# Patient Record
Sex: FEMALE | Race: WHITE
[De-identification: names, ages, dates, MRNs, and addresses within clinical notes are randomized per-mention and may not be internally consistent; named-entity substitution may affect disease eponyms.]

---

## 2019-07-04 ENCOUNTER — HOSPITAL ENCOUNTER (EMERGENCY)
Dept: HOSPITAL 74 - JER | Age: 29
Discharge: HOME | End: 2019-07-04
Payer: COMMERCIAL

## 2019-07-04 VITALS — DIASTOLIC BLOOD PRESSURE: 88 MMHG | HEART RATE: 82 BPM | SYSTOLIC BLOOD PRESSURE: 132 MMHG | TEMPERATURE: 97.9 F

## 2019-07-04 VITALS — BODY MASS INDEX: 32 KG/M2

## 2019-07-04 DIAGNOSIS — N20.0: Primary | ICD-10-CM

## 2019-07-04 LAB
ALBUMIN SERPL-MCNC: 3.8 G/DL (ref 3.4–5)
ALP SERPL-CCNC: 130 U/L (ref 45–117)
ALT SERPL-CCNC: 16 U/L (ref 13–61)
ANION GAP SERPL CALC-SCNC: 6 MMOL/L (ref 8–16)
APPEARANCE UR: (no result)
AST SERPL-CCNC: 9 U/L (ref 15–37)
BACTERIA # UR AUTO: 617.5 /HPF
BASOPHILS # BLD: 0.7 % (ref 0–2)
BILIRUB SERPL-MCNC: 0.2 MG/DL (ref 0.2–1)
BILIRUB UR STRIP.AUTO-MCNC: NEGATIVE MG/DL
BUN SERPL-MCNC: 20 MG/DL (ref 7–18)
CALCIUM SERPL-MCNC: 8.7 MG/DL (ref 8.5–10.1)
CASTS URNS QL MICRO: 10 /LPF (ref 0–8)
CHLORIDE SERPL-SCNC: 112 MMOL/L (ref 98–107)
CO2 SERPL-SCNC: 25 MMOL/L (ref 21–32)
COLOR UR: YELLOW
CREAT SERPL-MCNC: 0.8 MG/DL (ref 0.55–1.3)
CRYSTALS URNS QL MICRO: (no result) /HPF
DEPRECATED RDW RBC AUTO: 13.2 % (ref 11.6–15.6)
EOSINOPHIL # BLD: 0.7 % (ref 0–4.5)
EPITH CASTS URNS QL MICRO: 6.2 /HPF
GLUCOSE SERPL-MCNC: 100 MG/DL (ref 74–106)
HCT VFR BLD CALC: 41.1 % (ref 32.4–45.2)
HGB BLD-MCNC: 13.5 GM/DL (ref 10.7–15.3)
KETONES UR QL STRIP: (no result)
LEUKOCYTE ESTERASE UR QL STRIP.AUTO: (no result)
LIPASE SERPL-CCNC: 191 U/L (ref 73–393)
LYMPHOCYTES # BLD: 27.8 % (ref 8–40)
MCH RBC QN AUTO: 29.5 PG (ref 25.7–33.7)
MCHC RBC AUTO-ENTMCNC: 32.7 G/DL (ref 32–36)
MCV RBC: 90.1 FL (ref 80–96)
MONOCYTES # BLD AUTO: 5.8 % (ref 3.8–10.2)
NEUTROPHILS # BLD: 65 % (ref 42.8–82.8)
NITRITE UR QL STRIP: NEGATIVE
PH UR: 5.5 [PH] (ref 5–8)
PLATELET # BLD AUTO: 240 K/MM3 (ref 134–434)
PMV BLD: 8.9 FL (ref 7.5–11.1)
POTASSIUM SERPLBLD-SCNC: 3.6 MMOL/L (ref 3.5–5.1)
PROT SERPL-MCNC: 7.6 G/DL (ref 6.4–8.2)
PROT UR QL STRIP: (no result)
PROT UR QL STRIP: NEGATIVE
RBC # BLD AUTO: 288 /HPF (ref 0–4)
RBC # BLD AUTO: 4.57 M/MM3 (ref 3.6–5.2)
SODIUM SERPL-SCNC: 143 MMOL/L (ref 136–145)
SP GR UR: 1.03 (ref 1.01–1.03)
UROBILINOGEN UR STRIP-MCNC: 1 MG/DL (ref 0.2–1)
WBC # BLD AUTO: 11.5 K/MM3 (ref 4–10)
WBC # UR AUTO: 28 /HPF (ref 0–5)

## 2019-07-04 PROCEDURE — 3E0337Z INTRODUCTION OF ELECTROLYTIC AND WATER BALANCE SUBSTANCE INTO PERIPHERAL VEIN, PERCUTANEOUS APPROACH: ICD-10-PCS

## 2019-07-04 PROCEDURE — 3E0333Z INTRODUCTION OF ANTI-INFLAMMATORY INTO PERIPHERAL VEIN, PERCUTANEOUS APPROACH: ICD-10-PCS

## 2019-07-04 NOTE — PDOC
History of Present Illness





- General


Chief Complaint: Pain, Acute


Stated Complaint: LOWER BACK PAIN


Time Seen by Provider: 19 18:54


History Source: Patient


Exam Limitations: No Limitations





- History of Present Illness


Initial Comments: 


Patient reports sharp L back pain and LLQ abdominal pain "similar to having a 

baby" that has been present constantly today and has not been relieved with 

Motrin. Initially, the patient had sharp L back pain that woke her from sleep 

two nights ago, alleviated at that time after some Motrin. Yesterday the 

patient had some nausea, but no pain. Today, the patient denies nausea. Patient 

also reports months of lower abdominal pain "that feels like you have to pass 

gas but you can't", but that has typically been on both lower abdominal 

quadrants. She further notes that she has a copper IUD in place, and 

occasionally has crampy pain with this. She is not certain whether this pain is 

similar. LMP was about 2 weeks ago. On ROS, patient denies CP, SOB, fevers, 

chills, N/V, constipation/diarrhea. Patient does states she has had increased 

urinary frequency, up to 6 times today, without burning.


19 19:56








Past History





- Past Medical History


Allergies/Adverse Reactions: 


 Allergies











Allergy/AdvReac Type Severity Reaction Status Date / Time


 


No Known Allergies Allergy   Verified 19 18:54











Home Medications: 


Ambulatory Orders





Ciprofloxacin [Cipro -] 500 mg PO Q12H #14 tablet 19 


Ibuprofen 600 mg PO TID PRN #20 tablet 19 


Tamsulosin HCl [Flomax] 0.4 mg PO BID #14 capsule 19 











- Reproductive History


LMP comment: Around 2 weeks ago


 (#): 3


Para: 2





- Suicide/Smoking/Psychosocial Hx


Smoking History: Never smoked


Have you smoked in the past 12 months: No


Hx Alcohol Use: No


Drug/Substance Use Hx: No





**Review of Systems





- Review of Systems


Able to Perform ROS?: Yes


Constitutional: No: Chills, Fever, Night Sweats


Respiratory: No: Cough, Shortness of Breath


Cardiac (ROS): No: Chest Pain


ABD/GI: No: Constipated, Diarrhea, Nausea, Vomiting


: Yes: Frequency.  No: Burning





*Physical Exam





- Vital Signs


 Last Vital Signs











Temp Pulse Resp BP Pulse Ox


 


 98.4 F   77   16   136/88   100 


 


 19 18:55  19 18:55  19 18:55  19 18:55  19 18:55














- Physical Exam


General Appearance: Yes: Nourished, Appropriately Dressed


HEENT: positive: PHYLLIS


Neck: positive: Supple


Respiratory/Chest: positive: Lungs Clear, Normal Breath Sounds


Cardiovascular: positive: Regular Rhythm, Regular Rate


Gastrointestinal/Abdominal: positive: Normal Bowel Sounds, Soft.  negative: 

Tender





ED Treatment Course





- LABORATORY


CBC & Chemistry Diagram: 


 19 20:10





 19 20:10





Medical Decision Making





- Medical Decision Making


Patient history is concerning for pyelonephritis vs kidney stone vs UTI. Will 

send off urine, CBC, CMP, and urine hCG. Will also get lipase to check for 

pancreatitis.


19 20:05


UA reviewed, found to have 3+ blood. Concerning for renal colic. CT noncon abd/

pelvis ordered. Also shows UTI, will give outpatient abx to treat.


19 21:30








*DC/Admit/Observation/Transfer


Diagnosis at time of Disposition: 


 Flank pain, Nephrolithiasis








- Discharge Dispostion


Disposition: HOME


Condition at time of disposition: Improved


Decision to Admit order: No





- Prescriptions


Prescriptions: 


Ciprofloxacin [Cipro -] 500 mg PO Q12H #14 tablet


Ibuprofen 600 mg PO TID PRN #20 tablet


 PRN Reason: Pain


Tamsulosin HCl [Flomax] 0.4 mg PO BID #14 capsule





- Referrals


Referrals: 


Ronan Barrera MD [Staff Physician] - 





- Patient Instructions


Printed Discharge Instructions:  Kidney Stones -- Adult


Additional Instructions: 


You came to the ED with left sided flank pain. Using a CT scan of your abdomen 

and pelvis, you were diagnosed with a kidney stone. This should pass with 

adequate hydration. Please follow up with your urologist appointment. Take 1 

Ibuprofen every 6 hours as needed for pain. Please take your ciprofloxacin 

antibiotic twice daily for one week. Return to the ED for worsening pain that 

does not respond to NSAIDs or Tylenol.


Print Language: ENGLISH





- Post Discharge Activity

## 2019-07-04 NOTE — PDOC
Documentation entered by Junior Valdez SCRIBE, acting as scribe for Axel Grant MD.








Axel Grant MD:  This documentation has been prepared by the José Miguel hopkins Xhesika, SCRIBE, under my direction and personally reviewed by me in its 

entirety.  I confirm that the documentation accurately reflects all work, 

treatment, procedures, and medical decision making performed by me.  





Attending Attestation





- Resident


Resident Name: Liset Young





- ED Attending Attestation


I have performed the following: I have examined & evaluated the patient, The 

case was reviewed & discussed with the resident, I agree w/resident's findings 

& plan, Exceptions are as noted





- HPI


HPI: 





07/04/19 20:24


The patient is a 29 year old female, with no significant PMH of who presents to 

the emergency department with 2 days of Left flank pain and left lower quadrant 

pain. Patient reported the pain started approximately 2 days ago as been 

constant and crampy-like. She denies any nausea or vomiting reported that 

ibuprofen had release some of the symptoms. Denies fevers or chills. Reports 

urinary difficulty but denies dysuria. Denies hematuria.





The patient denies chest pain, shortness of breath, headache and dizziness. 

Denies fever, chills, nausea, vomiting, diarrhea and constipation. Denies 

hematuria.





Allergies: NKA














- Physicial Exam


PE: 





07/04/19 22:28





GENERAL: Awake, alert, and fully oriented, in no acute distress


HEAD: No signs of trauma


EYES: EOMI, sclera anicteric, conjunctiva clear


ENT: Auricles normal inspection, hearing grossly normal, nares patent, 

oropharynx clear without exudates. Moist mucosa


NECK: Normal ROM, supple, no lymphadenopathy, JVD, or masses


ABDOMEN: Soft, nontender,No guarding, no rebound.  No masses


Left sided CVA tenderness


EXTREMITIES: Normal range of motion, no edema.  No clubbing or cyanosis. No 

cords, erythema, or tenderness


NEUROLOGICAL: Cranial nerves II through XII grossly intact.  Normal speech, 

normal gait


SKIN: Warm, Dry, normal turgor, no rashes or lesions noted.





- Medical Decision Making





07/04/19 22:29





Vital Signs











Temp Pulse Resp BP Pulse Ox


 


 98.4 F   77   16   136/88   100 


 


 07/04/19 18:55  07/04/19 18:55  07/04/19 18:55  07/04/19 18:55  07/04/19 18:55











After reviewing the blood work and the urine work, the urinalysis, trace 

calcium oxalates as well as 3+ bloods. I suspect patient likely has renal 

colic. We'll obtain a spiral CT and reassess. Pain control including NSAIDs. 

Patient is not pregnant.


07/04/19 22:48





 CBC, BMP





 07/04/19 20:10 





 07/04/19 20:10 





 CMP











Sodium  143 mmol/L (136-145)   07/04/19  20:10    


 


Potassium  3.6 mmol/L (3.5-5.1)   07/04/19  20:10    


 


Chloride  112 mmol/L ()  H  07/04/19  20:10    


 


Carbon Dioxide  25 mmol/L (21-32)   07/04/19  20:10    


 


Anion Gap  6 MMOL/L (8-16)  L  07/04/19  20:10    


 


BUN  20.0 mg/dL (7-18)  H  07/04/19  20:10    


 


Creatinine  0.8 mg/dL (0.55-1.3)   07/04/19  20:10    


 


Est GFR (CKD-EPI)AfAm  115.47   07/04/19  20:10    


 


Est GFR (CKD-EPI)NonAf  99.63   07/04/19  20:10    


 


Random Glucose  100 mg/dL ()   07/04/19  20:10    


 


Calcium  8.7 mg/dL (8.5-10.1)   07/04/19  20:10    


 


Total Bilirubin  0.2 mg/dL (0.2-1)   07/04/19  20:10    


 


AST  9 U/L (15-37)  L  07/04/19  20:10    


 


ALT  16 U/L (13-61)   07/04/19  20:10    


 


Alkaline Phosphatase  130 U/L ()  H  07/04/19  20:10    


 


Total Protein  7.6 g/dl (6.4-8.2)   07/04/19  20:10    


 


Albumin  3.8 g/dl (3.4-5.0)   07/04/19  20:10    


 


Lipase  191 U/L ()   07/04/19  20:10    








 Urine Test Results











Urine Color  Yellow   07/04/19  20:10    


 


Urine Appearance  Cloudy   07/04/19  20:10    


 


Urine pH  5.5  (5.0-8.0)   07/04/19  20:10    


 


Ur Specific Gravity  1.035  (1.010-1.035)   07/04/19  20:10    


 


Urine Protein  Trace  (NEGATIVE)   07/04/19  20:10    


 


Urine Glucose (UA)  Negative  (NEGATIVE)   07/04/19  20:10    


 


Urine Ketones  1+  (NEGATIVE)  H  07/04/19  20:10    


 


Urine Blood  3+  (NEGATIVE)  H  07/04/19  20:10    


 


Urine Nitrite  Negative  (NEGATIVE)   07/04/19  20:10    


 


Urine Bilirubin  Negative  (NEGATIVE)   07/04/19  20:10    


 


Ur Leukocyte Esterase  1+  (NEGATIVE)  H  07/04/19  20:10    








CT shows 2.5 mm stone at left UVJ. Minimial hydronephrosis.





Pleural based nodule in the right lower lobe measuring 5.5 mm and left lower 

lobe measuring 5.6 and 6.0.


Pt has been informed of the results.


I informed her that the nodules are usually not malignant, but should be 

followed up as an outpatient with a Chest CT in the next several months.


A copy of the CT results were given to the patient and she will follow up.


Given 1+ leukocyte esterase and kidney stone, will treat as ciprofloxacin 500 

mg BID


Flomax for the kidney stones.


Follow up with urology.


Return precautions given including severe pain, fever.

## 2019-07-04 NOTE — PDOC
Rapid Medical Evaluation


Time Seen by Provider: 07/04/19 18:54


Medical Evaluation: 


 Allergies











Allergy/AdvReac Type Severity Reaction Status Date / Time


 


No Known Allergies Allergy   Verified 07/04/19 18:54











07/04/19 18:55


The patient presents with L sided low back pain for the past two days. Took 

400mg of Motrin around 4pm without relief of symptoms. Denies fever and dysuria

, but admits to frequency and nausea. Pt reports having an IUD


Exam: (+) CVA tenderness on the L, also associated LLQ discomfort


Orders; Urine, labs


Pt to proceed to the ER for further evaluation











**Discharge Disposition





- Diagnosis


 Flank pain








- Referrals





- Patient Instructions





- Post Discharge Activity

## 2024-10-05 ENCOUNTER — HOSPITAL ENCOUNTER (EMERGENCY)
Dept: HOSPITAL 74 - JER | Age: 34
Discharge: HOME | End: 2024-10-05
Payer: COMMERCIAL

## 2024-10-05 VITALS
TEMPERATURE: 98 F | DIASTOLIC BLOOD PRESSURE: 79 MMHG | SYSTOLIC BLOOD PRESSURE: 134 MMHG | RESPIRATION RATE: 18 BRPM | HEART RATE: 79 BPM

## 2024-10-05 VITALS — BODY MASS INDEX: 29.9 KG/M2

## 2024-10-05 DIAGNOSIS — R10.13: Primary | ICD-10-CM

## 2024-10-05 DIAGNOSIS — R10.33: ICD-10-CM

## 2024-10-05 LAB
ALBUMIN SERPL-MCNC: 4.3 G/DL (ref 3.4–5)
ALP SERPL-CCNC: 111 U/L (ref 45–117)
ALT SERPL-CCNC: 15 U/L (ref 13–61)
ANION GAP SERPL CALC-SCNC: 6 MMOL/L (ref 4–13)
APPEARANCE UR: CLEAR
AST SERPL-CCNC: 16 U/L (ref 15–37)
BACTERIA # UR AUTO: 595 /UL (ref 0–1359)
BASOPHILS # BLD: 0.7 % (ref 0–2)
BILIRUB SERPL-MCNC: 0.4 MG/DL (ref 0.2–1)
BILIRUB UR STRIP.AUTO-MCNC: NEGATIVE MG/DL
BUN SERPL-MCNC: 16.2 MG/DL (ref 7–18)
CALCIUM SERPL-MCNC: 9.1 MG/DL (ref 8.5–10.1)
CASTS URNS QL MICRO: 1 /UL (ref 0–3.1)
CHLORIDE SERPL-SCNC: 110 MMOL/L (ref 98–107)
CO2 SERPL-SCNC: 24 MMOL/L (ref 21–32)
COLOR UR: YELLOW
CREAT SERPL-MCNC: 0.7 MG/DL (ref 0.55–1.3)
DEPRECATED RDW RBC AUTO: 13.7 % (ref 11.6–15.6)
EOSINOPHIL # BLD: 0.7 % (ref 0–4.5)
EPITH CASTS URNS QL MICRO: 33 /UL (ref 0–25.1)
GLUCOSE SERPL-MCNC: 104 MG/DL (ref 74–106)
HCT VFR BLD CALC: 42.4 % (ref 32.4–45.2)
HGB BLD-MCNC: 13.8 GM/DL (ref 10.7–15.3)
HIV 1+2 AB+HIV1 P24 AG SERPL QL IA: NEGATIVE
KETONES UR QL STRIP: NEGATIVE
LEUKOCYTE ESTERASE UR QL STRIP.AUTO: (no result)
LYMPHOCYTES # BLD: 27.7 % (ref 8–40)
MCH RBC QN AUTO: 30 PG (ref 25.7–33.7)
MCHC RBC AUTO-ENTMCNC: 32.7 G/DL (ref 32–36)
MCV RBC: 91.8 FL (ref 80–96)
MONOCYTES # BLD AUTO: 6.2 % (ref 3.8–10.2)
NEUTROPHILS # BLD: 64.7 % (ref 42.8–82.8)
NITRITE UR QL STRIP: NEGATIVE
PH UR: 6 [PH] (ref 5–8)
PLATELET # BLD AUTO: 242 10^3/UL (ref 134–434)
PMV BLD: 7.9 FL (ref 7.5–11.1)
POTASSIUM SERPLBLD-SCNC: 4 MMOL/L (ref 3.5–5.1)
PROT SERPL-MCNC: 7.6 G/DL (ref 6.4–8.2)
PROT UR QL STRIP: NEGATIVE
PROT UR QL STRIP: NEGATIVE
RBC # BLD AUTO: 4.61 M/MM3 (ref 3.6–5.2)
RBC # BLD AUTO: 8 /UL (ref 0–23.9)
SODIUM SERPL-SCNC: 140 MMOL/L (ref 136–145)
SP GR UR: 1.02 (ref 1.01–1.03)
UROBILINOGEN UR STRIP-MCNC: 0.2 MG/DL (ref 0.2–1)
WBC # BLD AUTO: 6.6 K/MM3 (ref 4–10)
WBC # UR AUTO: 48 /UL (ref 0–25.8)

## 2024-10-05 PROCEDURE — 3E033GC INTRODUCTION OF OTHER THERAPEUTIC SUBSTANCE INTO PERIPHERAL VEIN, PERCUTANEOUS APPROACH: ICD-10-PCS

## 2024-10-05 PROCEDURE — 3E033NZ INTRODUCTION OF ANALGESICS, HYPNOTICS, SEDATIVES INTO PERIPHERAL VEIN, PERCUTANEOUS APPROACH: ICD-10-PCS

## 2024-10-05 RX ADMIN — ACETAMINOPHEN ONE MG: 10 INJECTION, SOLUTION INTRAVENOUS at 06:27

## 2024-10-05 RX ADMIN — FAMOTIDINE ONE MLS/HR: 20 INJECTION, SOLUTION INTRAVENOUS at 06:28

## 2024-10-05 RX ADMIN — ALUMINUM HYDROXIDE, MAGNESIUM HYDROXIDE, AND SIMETHICONE ONE ML: 200; 200; 20 SUSPENSION ORAL at 06:27
